# Patient Record
(demographics unavailable — no encounter records)

---

## 2025-05-30 NOTE — HISTORY OF PRESENT ILLNESS
[Born at ___ Wks Gestation] : The patient was born at [unfilled] weeks gestation [] : via normal spontaneous vaginal delivery [(1) _____] : [unfilled] [(5) _____] : [unfilled] [BW: _____] : weight of [unfilled] [Length: _____] : length of [unfilled] [DW: _____] : Discharge weight was [unfilled] [Age: ___] : [unfilled] year old mother [G: ___] : G [unfilled] [P: ___] : P [unfilled] [Rubella (Immune)] : Rubella immune [MBT: ____] : MBT - [unfilled] [Other: ____] : [unfilled] [] : Received phototherapy [Normal] : Normal [No] : No cigarette smoke exposure [Water heater temperature set at <120 degrees F] : Water heater temperature set at <120 degrees F [Rear facing car seat in back seat] : Rear facing car seat in back seat [Carbon Monoxide Detectors] : Carbon monoxide detectors at home [Smoke Detectors] : Smoke detectors at home. [[facility]] : at [facility] [HepBsAG] : HepBsAg negative [HIV] : HIV negative [VDRL/RPR (Reactive)] : VDRL/RPR nonreactive [None] : There are no risk factors [TcB: _____] : Transcutaneous Bilirubin [unfilled] mg/dL [FreeTextEntry1] : Maternal history of cold sores as needed Valtrex and migraines [FreeTextEntry9] : A pos as per mom [FreeTextEntry8] : Peds called for possible vacuum delivery and ICU resuscitation terminal bradycardia  Patient status post phototherapy for bilirubin of 7.6 and then 10.6 CCHD passed OAE passed bilaterally [Breast milk] : breast milk [Formula ___ oz/feed] : [unfilled] oz of formula per feed [Hours between feeds ___] : Child is fed every [unfilled] hours [___ voids per day] : [unfilled] voids per day [Frequency of stools: ___] : Frequency of stools: [unfilled]  stools [per day] : per day. [Dark green] : dark green [In Bassinet/Crib] : sleeps in bassinet/crib [On back] : sleeps on back [Co-sleeping] : no co-sleeping [Loose bedding, pillow, toys, and/or bumpers in crib] : no loose bedding, pillow, toys, and/or bumpers in crib [Pacifier] : Uses pacifier [Exposure to electronic nicotine delivery system] : No exposure to electronic nicotine delivery system [Hepatitis B Vaccine Given] : Hepatitis B vaccine given [de-identified] : bassinet back [NO] : No

## 2025-05-30 NOTE — DISCUSSION/SUMMARY
[Normal Growth] : growth [Normal Development] : developmental [No Elimination Concerns] : elimination [Continue Regimen] : feeding [No Skin Concerns] : skin [Normal Sleep Pattern] : sleep [None] : no known medical problems [Anticipatory Guidance Given] : Anticipatory guidance addressed as per the history of present illness section [No Vaccines] : no vaccines needed [No Medications] : ~He/She~ is not on any medications [Parent/Guardian] : Parent/Guardian [] : The components of the vaccine(s) to be administered today are listed in the plan of care. The disease(s) for which the vaccine(s) are intended to prevent and the risks have been discussed with the caretaker.  The risks are also included in the appropriate vaccination information statements which have been provided to the patient's caregiver.  The caregiver has given consent to vaccinate. [Term Infant] : term infant [ Transition] :  transition [ Care] :  care [Nutritional Adequacy] : nutritional adequacy [Parental Well-Being] : parental well-being [Safety] : safety [Hepatitis B In Hospital] : Hepatitis B not administered while in the hospital [FreeTextEntry1] : This is a initial   infant visit  following Hospital discharge. Diet and Bowel movements were discussed and Feeding advice given. Continue  care and feedings.  Review signs of respiratory distress (nasal flaring, retractions, abdominal breathing)- call 911 Review with parents if fever (100.4 rectally or higher), lethargy, irritability, or decrease in wet diapers to call. Answered all parents questions.  Follow up visit in 3-4 days for a weight and color check.

## 2025-05-30 NOTE — PHYSICAL EXAM
[Alert] : alert [Normocephalic] : normocephalic [Flat Open Anterior [place]] : flat open anterior fontanelle [PERRL] : PERRL [Red Reflex Bilateral] : red reflex bilateral [Normally Placed Ears] : normally placed ears [Auricles Well Formed] : auricles well formed [Clear Tympanic membranes] : clear tympanic membranes [Light reflex present] : light reflex present [Bony structures visible] : bony structures visible [Patent Auditory Canal] : patent auditory canal [Nares Patent] : nares patent [Palate Intact] : palate intact [Uvula Midline] : uvula midline [Supple, full passive range of motion] : supple, full passive range of motion [Symmetric Chest Rise] : symmetric chest rise [Clear to Auscultation Bilaterally] : clear to auscultation bilaterally [Regular Rate and Rhythm] : regular rate and rhythm [S1, S2 present] : S1, S2 present [+2 Femoral Pulses] : +2 femoral pulses [Soft] : soft [Bowel Sounds] : bowel sounds present [Umbilical Stump Dry, Clean, Intact] : umbilical stump dry, clean, intact [Normal external genitailia] : normal external genitalia [Central Urethral Opening] : central urethral opening [Testicles Descended Bilaterally] : testicles descended bilaterally [Patent] : patent [Normally Placed] : normally placed [No Abnormal Lymph Nodes Palpated] : no abnormal lymph nodes palpated [Symmetric Flexed Extremities] : symmetric flexed extremities [Startle Reflex] : startle reflex present [Suck Reflex] : suck reflex present [Rooting] : rooting reflex present [Palmar Grasp] : palmar grasp present [Plantar Grasp] : plantar reflex present [Symmetric [place]] : symmetric [place] [Acute Distress] : no acute distress [Icteric sclera] : nonicteric sclera [Discharge] : no discharge [Palpable Masses] : no palpable masses [Murmurs] : no murmurs [Tender] : nontender [Distended] : not distended [Hepatomegaly] : no hepatomegaly [Splenomegaly] : no splenomegaly [Lopez-Ortolani] : negative Lopez-Ortolani [Spinal Dimple] : no spinal dimple [Tuft of Hair] : no tuft of hair [Jaundice] : not jaundice

## 2025-06-04 NOTE — PHYSICAL EXAM
[NL] : warm, clear [Clear to Auscultation Bilaterally] : clear to auscultation bilaterally [Regular Rate and Rhythm] : regular rate and rhythm [Sheldon: ____] : Sheldon [unfilled] [FreeTextEntry2] : folded helix left ear [de-identified] : Mouth clear no thrush [FreeTextEntry9] : Cleaned umbilical area cord still attached [FreeTextEntry6] : Circumflex healed well

## 2025-06-04 NOTE — HISTORY OF PRESENT ILLNESS
[FreeTextEntry6] : 7 day old here for fu weight and color check. Mom's milk is send she is breast-feeding on demand patient is having wet diapers and bowel movements. Patient can take up to 2 ounces every 2 hours

## 2025-06-04 NOTE — DISCUSSION/SUMMARY
[FreeTextEntry1] : This is a follow-up weight check for a  infant. Weight gain has been appropriate since last visit.  Patient is close to birthweight Feedings have been going well.  Baby has good latch mom's milk is in. Patient has been stooling frequently and having wet diapers. Jaundice-persist bili has increased from 10-12. Advised to expose to sunlight we will follow-up bili check at the end of the week in 3 days. Mom will give a few bottles of formula each day until that visit. Umbilical area cleaned in the office discussed hygiene with parents keep open to the air to assist with drying. addendum- observe left ear if not improving refer to plastics.  patient will have routine office visit  in 3 weeks.  And at the end of the week for weight and color check. Total time dedicated to this patient visit , including preparing to see the patient ( eg.. Review of chart, any pertinent labs ect  ) obtaining and/ or  reviewing separately obtained history, performing medical exam,  evaluation, counseling and educating patient and family member, ordering any needed medication or labs and documenting clinical information in  the electronic medical record to patient / parent ___20____ minutes.

## 2025-06-04 NOTE — PHYSICAL EXAM
[NL] : warm, clear [Clear to Auscultation Bilaterally] : clear to auscultation bilaterally [Regular Rate and Rhythm] : regular rate and rhythm [Sheldon: ____] : Sheldon [unfilled] [FreeTextEntry2] : folded helix left ear [de-identified] : Mouth clear no thrush [FreeTextEntry9] : Cleaned umbilical area cord still attached [FreeTextEntry6] : Circumflex healed well

## 2025-06-06 NOTE — DISCUSSION/SUMMARY
[FreeTextEntry1] : This is a 10-day-old male here for follow-up weight and color check. Patient's bilirubin level has decreased to 10.3 from 12 he is tolerating breast-feeding and 2 bottles of supplemental formula. Patient's exam he looks less jaundice icteric sclera is lighter. Patient is having normal yellow seedy bowel movements. Discussed probability of breastmilk jaundice with parents this will gradually fade Patient also was noted to have some flexion of the penis to the left glans appears to have some degree of rotation Area has otherwise healed well will refer to urology for further evaluation.  Dr. Rodriguez Has appointment with Dr. Rdoriguez for evaluation of left ear folding of pinna  Return for routine visit at 1 month sooner if parents notice any changes in jaundice.

## 2025-06-06 NOTE — HISTORY OF PRESENT ILLNESS
[FreeTextEntry6] : This is a 10--day-old infant here for follow-up weight and color check   patient has been breast-feeding and also receives some supplemental formula with Similac organic 2 oa 2 x a day the of his feedings are breastmilk. Patient has frequent wet diapers yellow seedy stools.

## 2025-06-10 NOTE — HISTORY OF PRESENT ILLNESS
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  Born at 35 weeks, vaginal birth.  Parent denies complications with pregnancy or delivery.  Birth Weight : 6 lbs Current Weight : 6.4 Formula and breast milk.  No family history of ear deformities otherwise, healthy infant.  Parent reports normal feeding and elimination patterns. Normal development to this point.

## 2025-06-10 NOTE — PROCEDURE
[FreeTextEntry6] : Dx:  Congenital ear deformity, left  Procedure:  Infant ear molding using the EarWell Infant Ear Correction System  CPT- 54351F		[id number]   Physician:  Moisés Tripp M.D., [facility]  Anesthesia:  none  Complications;  none  Condition:  good  Clinical Summary: The patient was noted to have a left congenital ear deformity at birth, which has not improved. The patient is referred by pediatrician for correction of the deformity using infant ear molding.  There is a dean ear deformity of the left ear that is amenable to ear molding.    Procedure Note: After completion of feeding, the baby was swaddled and laid on the left side. The large cradle and medium retainer was chosen as the appropriate size as there was no encroachment on the retroauricular sulcus and there was adequate dimension within the cradle for the full dimension of the pinna.  Hair was then shaved to leave approximately a one quarter of an inch boundary beyond the adhesive footplate of the posterior cradle. Skin prep was next done with alcohol pads to remove any residual skin oil. The posterior cradle was then slipped over the ear and the posterior conformer aligned precisely with the desired position of the superior limb of the triangular fossa. Care was taken to leave approximately a 1 mm space between the posterior conformer and the retroauricular sulcus. The pinna was then displaced back into the cradle to ensure that the superior limb of the triangular fossa was in perfect alignment with the anti-helical fold. Next the adhesive liners of the posterior cradle were removed allowing the cradle to be secured to the scalp. In addition it was necessary to bend the retractor so as to conform to the ideal shape of the helical rim. The retractor was directly positioned over the abnormal shape of the helical rim. With these adjustments made the internal adhesive liners were removed and the retractor affixed to the inner surface of the cradle.

## 2025-06-25 NOTE — REASON FOR VISIT
[Post Op: _________] : a [unfilled] post op visit [Parent] : parent [FreeTextEntry1] :  dop-06/10/2025 s/p congenital bilateral ear deformity.

## 2025-06-27 NOTE — REASON FOR VISIT
[Initial Consultation] : an initial consultation [Penile torsion] : penile torsion [Parents] : parents

## 2025-07-03 NOTE — PHYSICAL EXAM
[Acute Distress] : no acute distress [Discharge] : no discharge [Palpable Masses] : no palpable masses [Murmurs] : no murmurs [Tender] : nontender [Distended] : not distended [Hepatomegaly] : no hepatomegaly [Splenomegaly] : no splenomegaly [Lopez-Ortolani] : negative Lopez-Ortolani [Spinal Dimple] : no spinal dimple [Tuft of Hair] : no tuft of hair [Jaundice] : no jaundice [Rash and/or lesion present] : no rash/lesion [de-identified] : Small early hemangioma side of right foot

## 2025-07-03 NOTE — HISTORY OF PRESENT ILLNESS
[Breast milk] : breast milk [___ voids per day] : [unfilled] voids per day [Frequency of stools: ___] : Frequency of stools: [unfilled]  stools [per day] : per day. [Yellow] : yellow [Seedy] : seedy [In Bassinet/Crib] : sleeps in bassinet/crib [Pacifier use] : Pacifier use [NO] : No [At risk for exposure to TB] : Not at risk for exposure to Tuberculosis  [FreeTextEntry7] : 1 mo here for rtov and immun.. he has been well.  [de-identified] : has ear mold in place on left - followed by dr pascual. fu next week [FreeTextEntry3] : 8p-mn  4 am

## 2025-07-03 NOTE — PHYSICAL EXAM
[Acute Distress] : no acute distress [Discharge] : no discharge [Palpable Masses] : no palpable masses [Murmurs] : no murmurs [Tender] : nontender [Distended] : not distended [Hepatomegaly] : no hepatomegaly [Splenomegaly] : no splenomegaly [Lopez-Ortolani] : negative Lopez-Ortolani [Spinal Dimple] : no spinal dimple [Tuft of Hair] : no tuft of hair [Jaundice] : no jaundice [Rash and/or lesion present] : no rash/lesion [de-identified] : Small early hemangioma side of right foot

## 2025-07-03 NOTE — DISCUSSION/SUMMARY
[Parental (Maternal) Well-Being] : parental (maternal) well-being [Infant-Family Synchrony] : infant-family synchrony [Nutritional Adequacy] : nutritional adequacy [Infant Behavior] : infant behavior [Term Infant] : term infant [Parental Well-Being] : parental well-being [Family Adjustment] : family adjustment [Feeding Routines] : feeding routines [Infant Adjustment] : infant adjustment [Safety] : safety [FreeTextEntry1] : Patient presents for 1 month well visit.   Recommend exclusive breastfeeding, 8 -12 feedings per day. Mother should continue prenatal vitamins and avoid alcohol. Baby is to start Vit D drops if exclusively breast fed. If formula is needed, recommend iron-fortified formulations, 2-4 oz every 2-3 hrs.   When in car, patient should be in rear-facing car seat in back seat. Put baby to sleep on back, in own crib with no loose or soft bedding. May alternate sided so that head shape remains symmetrical. Help baby to develop sleep and feeding routines. May offer pacifier if needed. Start tummy time when awake. Limit baby's exposure to others, especially those with fever or unknown vaccine status. Parents counseled to call if temperature >100.4 degrees F. Discussed sleeping- avoid co sleeping, loose bedding or pillows,toys or blankets in crib or bassinet.   Infant received Hep B #1 vaccination today. Immunization discussed; VIS form given to parent. Discussed side effects with parent. Mild reflux noted.  There is no projectile vomiting. Reassurance given that this is not pyloric stenosis type symptoms.  Parents are aware if there is forceful projectile vomiting patient will need to be seen in the emergency room/ultrasound. Patient has appointment with urologist for evaluation of penis/asymmetry of glans Small hemangioma developing on the right lateral foot. To return for RTOV in 1 month.

## 2025-07-16 NOTE — CONSULT LETTER
[FreeTextEntry1] : Dear Dr. KELSIE KENNEDY,      I had the pleasure of consulting on CHENTE CONSTANTINO today.  Below is my note regarding the office visit today.      Thank you so very much for allowing me to participate in CHENTE's care.  Please don't hesitate to call me should any questions or issues arise.        Sincerely,     Rickey Rodriguez MD, FACS, \[facility] [place] [facility]\""U    Chief, Pediatric Urology    Professor of Urology and Pediatrics    [employer]        President, [employer] Association - [place] Section    Past-President, [employer]eties [employer] Pediatric [employer]

## 2025-07-16 NOTE — CONSULT LETTER
[FreeTextEntry1] : Dear Dr. KELSIE KENNEDY,      I had the pleasure of consulting on CHENTE CONSTANTINO today.  Below is my note regarding the office visit today.      Thank you so very much for allowing me to participate in CHENTE's care.  Please don't hesitate to call me should any questions or issues arise.        Sincerely,     Rickey Rodriguez MD, FACS, [facility]    Chief, Pediatric Urology    Professor of Urology and Pediatrics    [employer]        President, [employer] Association - [place] Section    Past-President, Societies [employer] Pediatric U[employer]

## 2025-07-16 NOTE — ASSESSMENT
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 I discussed the conditions and explained the possible implications for future sexual performance. I discussed the management options of observation and surgical correction and their respective risks and benefits and possible complications. I went over the principles of the surgery using drawings and also went over the anticipated postoperative course. The family understands that if there is mild chordee, no plications sutures will be used. If there is still greater than 30-degree chordee, permanent suture will be used for plication. The possible complications include but not limited to persistent chordee, breakage of the plication suture with chordee recurrence, incomplete detorsion of the penis, penile skin deformities, bleeding and infection, penile injury and the need for additional surgery. I answered all of their questions. The family would like to proceed with surgery under general anesthesia. Surgery will take place after the age of 6 months.

## 2025-07-16 NOTE — HISTORY OF PRESENT ILLNESS
[TextBox_4] : CHENTE is here for evaluation today. He was born at 35 weeks after an unassisted conception and was circumcised in the nursery. The family reports concern for penile torsion following the circumcision. The penis has not changed in its configuration since the parents first noticed this. No penile redness or urinary tract infections. He makes ample wet diapers without hematuria. No family history of penile abnormalities.

## 2025-07-16 NOTE — PHYSICAL EXAM
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 Meatus orthotopic.   SCROTUM: Bilaterally symmetric testes in dependent position without palpable mass, hernia or hydrocele. 
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 Meatus orthotopic.   SCROTUM: Bilaterally symmetric testes in dependent position without palpable mass, hernia or hydrocele. 
160.9